# Patient Record
(demographics unavailable — no encounter records)

---

## 2025-01-27 NOTE — PHYSICAL EXAM
[2+] : left 2+ [Ankle Swelling (On Exam)] : not present [Varicose Veins Of Lower Extremities] : not present [] : not present [FreeTextEntry1] : No edema bilateral  No skin changes  No varicosities bilateral No ulcer Palpable DP pulses bilaterally

## 2025-01-27 NOTE — PLAN
[TextEntry] : I have reviewed tests with patient.  Suggest compression stockings 15-20 mmHG to be worn daily and not at night. Leg elevation Exercise Over the counter pain medication for discomfort Follow up with PMD if pain persists. No vascular follow up needed at this time. I have spent a total of 30 minutes with the patient and coordinating care.

## 2025-01-27 NOTE — ASSESSMENT
[FreeTextEntry1] : Ms. ERICKA HALL is a 33 year old with persistent left lower extremity discomfort.  Not vascular in nature. No venous pathology found on ultrasound. Patient has intact pulses in both legs without evidence of arterial insufficiency.

## 2025-01-27 NOTE — HISTORY OF PRESENT ILLNESS
[FreeTextEntry1] : Ms. ERICKA HALL is a 33 year old F who presents for initial evaluation of left leg discomfort for the past few years. Ms. HALL leg pain started when she was working as a  and standing on her feet for 6 hours.  States pain was intolerable to the point she quit her job due to the pain. Pain is described as burning in her left calf.  Only happens after standing for long periods of time. She complains she can see her veins on her left leg more than right. Her symptoms have persisted despite conservative management with leg elevation Ms. HALL risks factor for venous disease are pregnancyx1.  No previous treatment, vein procedures and vein surgeries.  She denies history of lower extremity claudication, rest pain, or tissue loss.